# Patient Record
Sex: MALE | Race: WHITE
[De-identification: names, ages, dates, MRNs, and addresses within clinical notes are randomized per-mention and may not be internally consistent; named-entity substitution may affect disease eponyms.]

---

## 2019-07-19 ENCOUNTER — HOSPITAL ENCOUNTER (EMERGENCY)
Dept: HOSPITAL 74 - JER | Age: 34
LOS: 1 days | Discharge: HOME | End: 2019-07-20
Payer: COMMERCIAL

## 2019-07-19 VITALS — DIASTOLIC BLOOD PRESSURE: 107 MMHG | TEMPERATURE: 98.5 F | SYSTOLIC BLOOD PRESSURE: 162 MMHG

## 2019-07-19 VITALS — BODY MASS INDEX: 24.8 KG/M2

## 2019-07-19 VITALS — HEART RATE: 119 BPM

## 2019-07-19 DIAGNOSIS — R00.0: ICD-10-CM

## 2019-07-19 DIAGNOSIS — F19.129: ICD-10-CM

## 2019-07-19 DIAGNOSIS — I15.8: Primary | ICD-10-CM

## 2019-07-19 DIAGNOSIS — F41.9: ICD-10-CM

## 2019-07-19 LAB
ALBUMIN SERPL-MCNC: 4.2 G/DL (ref 3.4–5)
ALP SERPL-CCNC: 73 U/L (ref 45–117)
ALT SERPL-CCNC: 36 U/L (ref 13–61)
AMPHET UR-MCNC: POSITIVE NG/ML
ANION GAP SERPL CALC-SCNC: 8 MMOL/L (ref 8–16)
APPEARANCE UR: (no result)
AST SERPL-CCNC: 34 U/L (ref 15–37)
BACTERIA # UR AUTO: 6.3 /HPF
BARBITURATES UR-MCNC: NEGATIVE NG/ML
BASOPHILS # BLD: 0.3 % (ref 0–2)
BENZODIAZ UR SCN-MCNC: NEGATIVE NG/ML
BILIRUB SERPL-MCNC: 0.3 MG/DL (ref 0.2–1)
BILIRUB UR STRIP.AUTO-MCNC: NEGATIVE MG/DL
BUN SERPL-MCNC: 12.9 MG/DL (ref 7–18)
CALCIUM SERPL-MCNC: 9 MG/DL (ref 8.5–10.1)
CASTS URNS QL MICRO: 8 /LPF (ref 0–8)
CHLORIDE SERPL-SCNC: 101 MMOL/L (ref 98–107)
CO2 SERPL-SCNC: 28 MMOL/L (ref 21–32)
COCAINE UR-MCNC: NEGATIVE NG/ML
COLOR UR: YELLOW
CREAT SERPL-MCNC: 1.2 MG/DL (ref 0.55–1.3)
DEPRECATED RDW RBC AUTO: 13.3 % (ref 11.9–15.9)
EOSINOPHIL # BLD: 2.3 % (ref 0–4.5)
EPITH CASTS URNS QL MICRO: 0.2 /HPF
GLUCOSE SERPL-MCNC: 103 MG/DL (ref 74–106)
HCT VFR BLD CALC: 45.8 % (ref 35.4–49)
HGB BLD-MCNC: 15.7 GM/DL (ref 11.7–16.9)
INR BLD: 0.96 (ref 0.83–1.09)
KETONES UR QL STRIP: NEGATIVE
LEUKOCYTE ESTERASE UR QL STRIP.AUTO: NEGATIVE
LYMPHOCYTES # BLD: 32.3 % (ref 8–40)
MCH RBC QN AUTO: 31.3 PG (ref 25.7–33.7)
MCHC RBC AUTO-ENTMCNC: 34.3 G/DL (ref 32–35.9)
MCV RBC: 91.2 FL (ref 80–96)
METHADONE UR-MCNC: NEGATIVE NG/ML
MONOCYTES # BLD AUTO: 14.3 % (ref 3.8–10.2)
NEUTROPHILS # BLD: 50.8 % (ref 42.8–82.8)
NITRITE UR QL STRIP: NEGATIVE
OPIATES UR QL SCN: NEGATIVE NG/ML
PCP UR QL SCN: NEGATIVE NG/ML
PH UR: 6.5 [PH] (ref 5–8)
PLATELET # BLD AUTO: 225 K/MM3 (ref 134–434)
PMV BLD: 8.9 FL (ref 7.5–11.1)
POTASSIUM SERPLBLD-SCNC: 4.2 MMOL/L (ref 3.5–5.1)
PROT SERPL-MCNC: 7.8 G/DL (ref 6.4–8.2)
PROT UR QL STRIP: NEGATIVE
PROT UR QL STRIP: NEGATIVE
PT PNL PPP: 11.3 SEC (ref 9.7–13)
RBC # BLD AUTO: 2 /HPF (ref 0–4)
RBC # BLD AUTO: 5.02 M/MM3 (ref 4–5.6)
SODIUM SERPL-SCNC: 136 MMOL/L (ref 136–145)
SP GR UR: 1.02 (ref 1.01–1.03)
UROBILINOGEN UR STRIP-MCNC: 0.2 MG/DL (ref 0.2–1)
WBC # BLD AUTO: 7.4 K/MM3 (ref 4–10)
WBC # UR AUTO: 0 /HPF (ref 0–5)

## 2019-07-19 PROCEDURE — 3E0337Z INTRODUCTION OF ELECTROLYTIC AND WATER BALANCE SUBSTANCE INTO PERIPHERAL VEIN, PERCUTANEOUS APPROACH: ICD-10-PCS

## 2019-07-19 PROCEDURE — G0480 DRUG TEST DEF 1-7 CLASSES: HCPCS

## 2019-07-19 NOTE — PDOC
Documentation entered by Adriel Handley SCRIBE, acting as scribe for Dionicio Garvin MD.








Dionicio Garvin MD:  This documentation has been prepared by the catrachoeEmmanuelle Elijah, SCRIBE, under my direction and personally reviewed by me in its entirety.  I 

confirm that the documentation accurately reflects all work, treatment, 

procedures, and medical decision making performed by me.  





Attending Attestation





- Resident


Resident Name: Isaac Henry





- ED Attending Attestation


I have performed the following: I have examined & evaluated the patient, The 

case was reviewed & discussed with the resident, I agree w/resident's findings 

& plan, Exceptions are as noted





- HPI


HPI: 





07/19/19 22:53


Patient is a 34 year old male with no reported past medical history who 

presents to the ED with intoxication. Patient reports taking ecstasy x3 hours 

ago and began to feel agitated, hot, sweaty, dehydrated, and feel his heart 

racing. Patient was worried that there was becoming anxious after taking these 

drugs which prompted the visit to the ED. Patient also noted he smoked a joint 

of marijuana and drank 2 beers today. 


Denies cough, sob, headache, nausea, and vomiting. 


Allergies: NKDA


Social History: Uses Marijuana and Adderall regularly











- Physicial Exam


PE: 





07/19/19 22:56


GENERAL: Awake, alert, and fully oriented, in no acute distress. Anxious. 

Mildly diaphoretic.


HEAD: No signs of trauma. Pupils dilated but reactive.


ENT: Hearing grossly normal.


NECK: Normal ROM. Supple.


LUNGS: Breath sounds equal, clear to auscultation bilaterally.  No wheezes, and 

no crackles


HEART: Regular rate and rhythm, normal S1 and S2, no murmurs, rubs or gallops


ABDOMEN: Soft, nontender.  No guarding, no rebound.


EXTREMITIES: Normal range of motion, no edema.  No clubbing or cyanosis. No 

cords, erythema, or tenderness


NEUROLOGICAL: Cranial nerves II through XII grossly intact.  Normal speech.


SKIN: Warm, Dry. No rashes or lesions noted.











- Medical Decision Making





07/19/19 23:05





A portion of this note was documented by [employer]ino services under my direction. I 

have reviewed the details of the note, within reason, and agree with the 

documentation with the following case summary and management plan written by 

me. 





Patient treated in the ED.





Nursing notes are reviewed and incorporated into the medical decision-making.


Vital signs reviewed.





Peripheral IV access obtained by the nurse, laboratory studies are drawn and 

sent, reviewed and interpreted by myself. 





Vital Signs











Temp Pulse Resp BP Pulse Ox


 


 98.5 F   119 H  19   162/107 H  100 


 


 07/19/19 21:09  07/19/19 21:09  07/19/19 21:09  07/19/19 21:09  07/19/19 21:09





This is a 34-year-old male with multiple poly-ingestions including ecstasy, 

alcohol and marijuana. The patient is intoxicated medications but otherwise not 

agitated at this moment. Patient has dilated pupils tachycardia high, 

hypertension, diaphoresis consistent with sympathomimetic features. I agree 

with the plan to give is a denies pains in monitor patient and draw blood work. 

Once the patient is sober the patient be discharged home if the workup is 

unremarkable.


07/19/19 23:39





Pt is much more sober and alert and ambulatory. He is requesting to go home 

with his friend.





 CBC, BMP





 07/19/19 22:00 





 07/19/19 22:00 





 CMP











Sodium  136 mmol/L (136-145)   07/19/19  22:00    


 


Potassium  4.2 mmol/L (3.5-5.1)   07/19/19  22:00    


 


Chloride  101 mmol/L ()   07/19/19  22:00    


 


Carbon Dioxide  28 mmol/L (21-32)   07/19/19  22:00    


 


Anion Gap  8 MMOL/L (8-16)   07/19/19  22:00    


 


BUN  12.9 mg/dL (7-18)   07/19/19  22:00    


 


Creatinine  1.2 mg/dL (0.55-1.3)   07/19/19  22:00    


 


Est GFR (CKD-EPI)AfAm  91.53   07/19/19  22:00    


 


Est GFR (CKD-EPI)NonAf  78.97   07/19/19  22:00    


 


Random Glucose  103 mg/dL ()   07/19/19  22:00    


 


Calcium  9.0 mg/dL (8.5-10.1)   07/19/19  22:00    


 


Total Bilirubin  0.3 mg/dL (0.2-1)   07/19/19  22:00    


 


AST  34 U/L (15-37)   07/19/19  22:00    


 


ALT  36 U/L (13-61)   07/19/19  22:00    


 


Alkaline Phosphatase  73 U/L ()   07/19/19  22:00    


 


Creatine Kinase  219 U/L ()   07/19/19  22:00    


 


Creatine Kinase Index  0.4 % (0.0-5.0)   07/19/19  22:00    


 


CK-MB (CK-2)  < 1.0 ng/mL (0.5-3.6)   07/19/19  22:00    


 


Troponin I  < 0.02 ng/ml (0.00-0.05)   07/19/19  22:00    


 


Total Protein  7.8 g/dl (6.4-8.2)   07/19/19  22:00    


 


Albumin  4.2 g/dl (3.4-5.0)   07/19/19  22:00    








 Urine Test Results











Urine Color  Yellow   07/19/19  22:32    


 


Urine Appearance  Turbid   07/19/19  22:32    


 


Urine pH  6.5  (5.0-8.0)   07/19/19  22:32    


 


Ur Specific Gravity  1.019  (1.010-1.035)   07/19/19  22:32    


 


Urine Protein  Negative  (NEGATIVE)   07/19/19  22:32    


 


Urine Glucose (UA)  Negative  (NEGATIVE)   07/19/19  22:32    


 


Urine Ketones  Negative  (NEGATIVE)   07/19/19  22:32    


 


Urine Blood  Trace  (NEGATIVE)   07/19/19  22:32    


 


Urine Nitrite  Negative  (NEGATIVE)   07/19/19  22:32    


 


Urine Bilirubin  Negative  (NEGATIVE)   07/19/19  22:32    


 


Ur Leukocyte Esterase  Negative  (NEGATIVE)   07/19/19  22:32    











07/19/19 23:39


Drug screen positive amphetamines and marijuana





**Heart Score/ECG Review


  ** #1


ECG reviewed & interpreted by me at: 23:00





07/19/19 23:03


NSR 71, rightward axis, normal intervals, no std/melba,  msec

## 2019-07-19 NOTE — PDOC
Rapid Medical Evaluation


Chief Complaint: Substance Abuse


Time Seen by Provider: 07/19/19 21:02


Medical Evaluation: 


Vital Signs











Temp Pulse Resp BP Pulse Ox


 


 98.5 F   19 L  19   162/107 H  100 


 


 07/19/19 21:09  07/19/19 21:09  07/19/19 21:09 07/19/19 21:09 07/19/19 21:09 07/19/19 21:25





I have performed a brief in-person evaluation of this patient.





The patient presents with a chief complaint of:diaphoresis w/ dizziness and 

chills today. H/o ETOH abuse, last drank today and possible ingested crystal 

meth





Pertinent physical exam findings:tachy and hypertensive





I have ordered the following:ekg/labs





The patient will proceed to the ED for further evaluation.











**Discharge Disposition





- Diagnosis


 Diaphoresis








- Referrals





- Patient Instructions





- Post Discharge Activity

## 2019-07-19 NOTE — PDOC
History of Present Illness





- General


Chief Complaint: Substance Abuse


Stated Complaint: INTOX


Time Seen by Provider: 07/19/19 21:02


History Source: Patient


Exam Limitations: No Limitations, Intoxication





- History of Present Illness


Initial Comments: 








Michael Velasco is a 33 yo M who denies having any pmh who presents to the 

[facility] ER as a walk in with the chief complaint of intoxication. The patient 

states he ingested a crystalized pill of ecstacy at 7 pm tonight and came into 

the Er because he felt extremely hot, dehydrated, would not stop sweating, felt 

agitated, felt like his heart was beating fast and he was experiencing 

palpitations. The patient also smoked one joint of marijuana this morning, and 

had 2 beers today. Patient states he cannot stop drinking water but feels like 

his mouth is dry. He is nervous that something is wrong with him. 





Patient denies experiencing any seizures, chest pain, SOB, difficulty breathing

, syncope, other illicit co-ingestions, headache, nausea, vomiting, blurry 

vision, weakness, numbness, tingling, chills, dysuria, frequency, or urgency.





PCP: None


PSH: None reported


Social Hx: Took ecstacy today, drinks a few beers daily, smokes marijuana daily

, uses adderall regularly.  


Allergies: Pollen, NKDA











Past History





- Past Medical History


COPD: No





- Suicide/Smoking/Psychosocial Hx


Smoking History: Unknown if ever smoked


Hx Alcohol Use: Yes


Drug/Substance Use Hx: Yes





**Review of Systems





- Review of Systems


Able to Perform ROS?: Yes


Comments:: 








CONSTITUTIONAL: 


Present: diaphoresis


Absent: fever, chills, generalized weakness, malaise, loss of appetite


HEENT: 


Present: dry mouth


Absent: rhinorrhea, nasal congestion, throat pain, throat swelling, difficulty 

swallowing, mouth swelling, ear pain, eye pain, visual Changes


CARDIOVASCULAR: 


Present: Palpitations, lightheadedness


Absent: chest pain, syncope, irregular heart rate, peripheral edema


RESPIRATORY: 


Absent: cough, shortness of breath, dyspnea with exertion, orthopnea, wheezing, 

stridor, hemoptysis


GASTROINTESTINAL:


Absent: abdominal pain, abdominal distension, nausea, vomiting, diarrhea, 

constipation, melena, hematochezia


GENITOURINARY: 


Absent: dysuria, frequency, urgency, hesitancy, hematuria, flank pain, genital 

pain


MUSCULOSKELETAL: 


Absent: myalgia, arthralgia, joint swelling


SKIN: 


Absent: rash, itching, pallor


HEMATOLOGIC/IMMUNOLOGIC: 


Absent: easy bleeding, easy bruising, lymphadenopathy, frequent infections


ENDOCRINE:


Absent: unexplained weight gain, unexplained weight loss, heat intolerance, 

cold intolerance


NEUROLOGIC: 


Absent: headache, focal weakness or paresthesias, dizziness, unsteady gait, 

seizure, mental status changes, bladder or bowel incontinence


PSYCHIATRIC: 


Present: Anxiety


Absent: depression, suicidal or homicidal ideation, hallucinations.











*Physical Exam





- Vital Signs


 Last Vital Signs











Temp Pulse Resp BP Pulse Ox


 


 98.5 F   119 H  19   162/107 H  100 


 


 07/19/19 21:09  07/19/19 21:09  07/19/19 21:09  07/19/19 21:09  07/19/19 21:09














- Physical Exam


Comments: 








GENERAL:


Patient is profusely diaphoretic. Well developed, well nourished. Awake and 

alert. No acute distress.


HEENT:


Pupils are very dilated. Dry mouth. Normocephalic, atraumatic. PERRLA, EOMI. No 

conjunctival pallor. Sclera are non-icteric. Oropharynx is clear.


NECK: 


Supple. Full ROM. No JVD. No thyromegaly. No lymphadenopathy.


CARDIOVASCULAR:


Tachycardic rate and regular rhythm. No murmurs, rubs, or gallops. Distal 

pulses are 2+ and symmetric. 


PULMONARY: 


No evidence of respiratory distress. Lungs clear to auscultation bilaterally. 

No wheezing, rales or rhonchi.


ABDOMINAL:


Hyperactive bowel sounds. Soft. Non-tender. Non-distended. No rebound or 

guarding. No organomegaly. 


MUSCULOSKELETAL 


Normal range of motion at all joints. No bony deformities or tenderness. No CVA 

tenderness.


EXTREMITIES: 


No cyanosis. No clubbing. No edema. No calf tenderness.


SKIN: 


Profusely diaphoretic. Warm. Normal capillary refill. No rashes. No jaundice. 


NEUROLOGICAL: 


Alert, awake, appropriate. Cranial nerves 2-12 intact. No deficits to light 

touch in face, upper extremities and lower extremities. No motor deficits in 

the in face, upper extremities and lower extremities. Normal speech. Gait is 

normal without ataxia.


PSYCHIATRIC: 


Mildly anxious. Cooperative. Good eye contact. Appropriate mood and affect.














**Heart Score/ECG Review





- ECG Intrepretation


Rhythm: Regular Rhythm





- Axis


Axis: Right Axis Deviation





- P and FL


Prominent R with upright T in V1 (true posterior MI): No


Delta Wave(s) Present: No


WPW: No





- QRS


Poor R Wave Progression: No


Q Wave Present: No





- ST and T


Early Repolarization: Yes


Non Specific ST-T Wave changes: No


Flattened T Waves: No


Prolonged Q-T Interval: No





- ECG Impressions


Normal ECG: No


Non-specific ST Elevation: No


Ischemic Changes: No


Torsades toan Pointes: No


WPW: No





ED Treatment Course





- LABORATORY


CBC & Chemistry Diagram: 


 07/19/19 22:00





 07/19/19 22:00





Medical Decision Making





- Medical Decision Making








Michael Velasco is a 33 yo M who denies having any pmh who presents to the 

[facility] ER as a walk in with the chief complaint of intoxication. The patient 

states he ingested a crystalized pill of ecstacy at 7 pm tonight and came into 

the Er because he felt extremely hot, dehydrated, would not stop sweating, felt 

agitated, felt like his heart was beating fast and he was experiencing 

palpitations. The patient also smoked one joint of marijuana this morning, and 

had 2 beers today. Patient states he cannot stop drinking water but feels like 

his mouth is dry. He is nervous that something is wrong with him. 





Vital Signs











Temp Pulse Resp BP Pulse Ox


 


 98.5 F   119 H  19   162/107 H  100 


 


 07/19/19 21:09  07/19/19 21:09  07/19/19 21:09  07/19/19 21:09  07/19/19 21:09








-  Repeat HR at bedside 77 after 1L NS





DDx IBNLT: electrolyte/metabolic disturbance, dehydration, hyponatremia, SIADH, 

hyperthermia, Serotonin syndrome, hyperthermia, seizures, hallucinations. 





Plan: Labs, Urine, EKG, IV hydration, libirum, re-assess. 





Labs: Normal with no acute abnormalities. Normal sodium level. No evidence of 

dehydration. 


Urine: Clean with no abnormalities or signs of acute infection. 





Urine tox: Positive for marijuana, amphetamines, and ecstacy





EKG: NS rate of 71, narrow complexes, RAD, no hypertrophy, no ST elevations or 

depressions, no abnormal TWI, FL - 192, QTc - 434. 





Re-assess: Patient no longer feels agitated, has no more palpitations, is not 

sweating anymore, does not feel hot or overheated, is urinating and no longer 

feels dehydrated. 





Disposition: Home with PCP fu. 


- Strict return precautions


- Drug abuse counsil provided. 














*DC/Admit/Observation/Transfer


Diagnosis at time of Disposition: 


 Diaphoresis, Ecstasy abuse, Tachycardia, Agitation, Anxiety about health





Hypertension


Qualifiers:


 Hypertension type: other secondary hypertension Qualified Code(s): I15.8 - 

Other secondary hypertension








- Discharge Dispostion


Disposition: HOME


Condition at time of disposition: Improved


Decision to Admit order: No





- Referrals


Referrals: 


[facility] [place] [Provider Group]





- Patient Instructions


Printed Discharge Instructions:  Lifestyle Changes as Effective as Drugs in 

Preventing Progression to Diabet, Are You Taking Drugs You Don't Need?, Drugs 

That May Lead to Heart Damage, DI for Adverse Drug Reaction -- Allergic


Additional Instructions: 


You came into the ER after taking ecstacy. We looked at your blood and urine 

and found no abnormalities. We gave you IV hydration to make sure you are not 

dehydrated. 





We also did an electrocardiogram which showed that you have what is called a 

rightward heart axis. This is probably not something to worry about at the 

present time but it is something that is important to follow up with with your 

doctor. We handed you a copy of your EKG to give to your doctor and discuss 

this EKG with your primary care doctor in the next 3 to 5 days.  





Please do your best to stop using illicit drugs. If you would like to goto a de-

tox center come back at anytime and we will direct you to one. 





We are giving you the number for a primary doctor to call up and establish care 

with. Please make sure to call up the number we are providing you with and set 

up an appointment. 





Come back to the ER immediately if your symptoms worsen, you have chest pain, 

have a seizure, hallucinate, feel extremely anxious or agitated, or have any 

other new or worsening concerns. 





Thank you for coming to the [facility] ER. We hope you feel better soon!


Print Language: ENGLISH





- Post Discharge Activity

## 2019-07-21 NOTE — EKG
Test Reason : 

Blood Pressure : ***/*** mmHG

Vent. Rate : 071 BPM     Atrial Rate : 071 BPM

   P-R Int : 192 ms          QRS Dur : 106 ms

    QT Int : 400 ms       P-R-T Axes : 061 090 059 degrees

   QTc Int : 434 ms

 

NORMAL SINUS RHYTHM

RIGHTWARD AXIS

BORDERLINE ECG

NO PREVIOUS ECGS AVAILABLE

Confirmed by INDIRA SCHMIDT, TALIB ([id number]) on 7/21/2019 12:04:24 AM

 

Referred By:             Confirmed By:TALIB JACOBSON MD

## 2021-08-26 ENCOUNTER — HOSPITAL ENCOUNTER (EMERGENCY)
Dept: HOSPITAL 74 - JER | Age: 36
Discharge: LEFT BEFORE DISCHARGE | End: 2021-08-26
Payer: COMMERCIAL

## 2021-08-26 VITALS — TEMPERATURE: 98.1 F | HEART RATE: 89 BPM | SYSTOLIC BLOOD PRESSURE: 116 MMHG | DIASTOLIC BLOOD PRESSURE: 84 MMHG

## 2021-08-26 VITALS — BODY MASS INDEX: 25.7 KG/M2

## 2021-08-26 DIAGNOSIS — M54.5: Primary | ICD-10-CM

## 2023-05-29 ENCOUNTER — HOSPITAL ENCOUNTER (EMERGENCY)
Dept: HOSPITAL 74 - FER | Age: 38
Discharge: HOME | End: 2023-05-29
Payer: COMMERCIAL

## 2023-05-29 VITALS
RESPIRATION RATE: 15 BRPM | TEMPERATURE: 98.4 F | HEART RATE: 76 BPM | DIASTOLIC BLOOD PRESSURE: 85 MMHG | SYSTOLIC BLOOD PRESSURE: 135 MMHG

## 2023-05-29 VITALS — BODY MASS INDEX: 25.1 KG/M2

## 2023-05-29 DIAGNOSIS — H57.12: Primary | ICD-10-CM

## 2023-05-29 DIAGNOSIS — H18.892: ICD-10-CM
